# Patient Record
(demographics unavailable — no encounter records)

---

## 2025-04-15 NOTE — PHYSICAL EXAM
[MA] : MA [Appropriately responsive] : appropriately responsive [Alert] : alert [No Acute Distress] : no acute distress [No Lymphadenopathy] : no lymphadenopathy [Regular Rate Rhythm] : regular rate rhythm [No Murmurs] : no murmurs [Clear to Auscultation B/L] : clear to auscultation bilaterally [Soft] : soft [Non-tender] : non-tender [Non-distended] : non-distended [No HSM] : No HSM [No Lesions] : no lesions [No Mass] : no mass [Oriented x3] : oriented x3 [Examination Of The Breasts] : a normal appearance [No Masses] : no breast masses were palpable [Labia Majora] : normal [Labia Minora] : normal [Normal] : normal [Uterine Adnexae] : normal [FreeTextEntry2] : [place]

## 2025-04-15 NOTE — PROCEDURE
[Cervical Pap Smear] : cervical Pap smear [Liquid Base] : liquid base [Tolerated Well] : the patient tolerated the procedure well [No Complications] : there were no complications [Pelvic Pain] : pelvic pain [Abnormal Uterine Bleeding] : abnormal uterine bleeding [Transvaginal Ultrasound] : transvaginal ultrasound [Anteverted] : anteverted [No Fibroid(s)] : no fibroid(s) [L: ___ cm] : L: [unfilled] cm [H: ___ cm] : H: [unfilled] cm [FreeTextEntry7] : 2.0 x 3.8 cm [FreeTextEntry8] : 2.2 x 3.5 cm [FreeTextEntry6] : Bilateral ovaries contain multiple subcentimeter cysts

## 2025-04-15 NOTE — DISCUSSION/SUMMARY
[FreeTextEntry1] : Pap done Gonorrhea chlamydia test done B VV test done Issues regarding dysmenorrhea/menorrhagia/polycystic ovaries discussed with patient and her mother Patient given trial packs of oral contraceptives in the form of lo Loestrin FE with instructions/precautions Follow-up 6 months or as needed

## 2025-04-15 NOTE — HISTORY OF PRESENT ILLNESS
[FreeTextEntry1] : Patient is 21 years old para 0-0-0-0 last menstrual period April 2, 2025 Patient notes regular menstrual periods every month, she complains of heavy and painful menstrual periods lasting up to 7 days She is interested in trial of oral contraceptives.  She is accompanied by her mother. Patient was noted to have trichomonas vaginalis diagnosed at [facility] emergency department on April 7, 2025 and treated with Tindazole

## 2025-04-15 NOTE — REASON FOR VISIT
[Annual] : an annual visit. [Dysmenorrhea] : dysmenorrhea [Parent] : parent [Menorrhagia] : menorrhagia

## 2025-06-19 NOTE — HISTORY OF PRESENT ILLNESS
[FreeTextEntry1] : Patient is 21 years old para 0-0-0-0 last menstrual period June 6, 2025 Patient complains of green vaginal discharge Patient is presently on second month of oral contraceptives in the form of lo Loestrin FE and notes occasional breakthrough bleeding during her first month. Urine analysis notes moderate leukocytes.  Negative for microscopic hematuria.  Urine culture sent

## 2025-06-19 NOTE — DISCUSSION/SUMMARY
[FreeTextEntry1] : B VV test done Urine culture sent Continue trial of oral contraceptives in the form of lo Loestrin FE Follow-up as scheduled

## 2025-06-19 NOTE — REASON FOR VISIT
[Follow-Up] : a follow-up evaluation of [Abnormal Uterine Bleeding] : abnormal uterine bleeding [Vulvar/Vaginal Complaint] : vulvar/vaginal complaint [Parent] : parent

## 2025-06-19 NOTE — PHYSICAL EXAM
[MA] : MA [FreeTextEntry2] : NADIRA [Appropriately responsive] : appropriately responsive [Alert] : alert [No Acute Distress] : no acute distress [No Lymphadenopathy] : no lymphadenopathy [Regular Rate Rhythm] : regular rate rhythm [No Murmurs] : no murmurs [Clear to Auscultation B/L] : clear to auscultation bilaterally [Soft] : soft [Non-tender] : non-tender [Non-distended] : non-distended [No HSM] : No HSM [No Lesions] : no lesions [No Mass] : no mass [Oriented x3] : oriented x3 [Labia Majora] : normal [Labia Minora] : normal [Discharge] : a  ~M vaginal discharge was present [Normal] : normal [Uterine Adnexae] : normal [FreeTextEntry4] : White thick vaginal discharge noted